# Patient Record
Sex: MALE | URBAN - NONMETROPOLITAN AREA
[De-identification: names, ages, dates, MRNs, and addresses within clinical notes are randomized per-mention and may not be internally consistent; named-entity substitution may affect disease eponyms.]

---

## 2021-05-14 ENCOUNTER — OFFICE VISIT (OUTPATIENT)
Dept: URBAN - NONMETROPOLITAN AREA CLINIC 1 | Facility: CLINIC | Age: 66
End: 2021-05-14
Payer: MEDICARE

## 2021-05-14 DIAGNOSIS — H52.4 PRESBYOPIA: ICD-10-CM

## 2021-05-14 DIAGNOSIS — H11.152 PINGUECULA, LEFT EYE: Primary | ICD-10-CM

## 2021-05-14 PROCEDURE — 99203 OFFICE O/P NEW LOW 30 MIN: CPT | Performed by: OPTOMETRIST

## 2021-05-14 PROCEDURE — 92015 DETERMINE REFRACTIVE STATE: CPT | Performed by: OPTOMETRIST

## 2021-05-14 ASSESSMENT — VISUAL ACUITY
OD: 20/20
OS: 20/20

## 2021-05-14 ASSESSMENT — INTRAOCULAR PRESSURE
OD: 18
OS: 20

## 2021-05-14 ASSESSMENT — KERATOMETRY
OD: 42.75
OS: 43.13

## 2021-05-14 NOTE — IMPRESSION/PLAN
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 Plan: Patient educated on condition. ATs prn for comfort. UV eye protection outdoors recommended. Continue to monitor. Call office if worsens or gets bigger.

## 2021-05-14 NOTE — IMPRESSION/PLAN
Impression: Presbyopia: H52.4. Plan: Patient educated on condition. Patient okay to continue use of OTC readers at near if prefers.